# Patient Record
Sex: MALE | Race: WHITE | Employment: OTHER | URBAN - METROPOLITAN AREA
[De-identification: names, ages, dates, MRNs, and addresses within clinical notes are randomized per-mention and may not be internally consistent; named-entity substitution may affect disease eponyms.]

---

## 2020-11-10 ENCOUNTER — HOSPITAL ENCOUNTER (OUTPATIENT)
Dept: BEHAVIORAL HEALTH | Facility: CLINIC | Age: 30
Discharge: HOME | End: 2020-11-10
Attending: FAMILY MEDICINE | Admitting: FAMILY MEDICINE

## 2020-11-10 VITALS — BODY MASS INDEX: 26.24 KG/M2 | WEIGHT: 198 LBS | HEIGHT: 73 IN

## 2020-11-10 DIAGNOSIS — F16.20: ICD-10-CM

## 2020-11-10 DIAGNOSIS — F15.20 AMPHETAMINE USE DISORDER, SEVERE, DEPENDENCE (H): ICD-10-CM

## 2020-11-10 DIAGNOSIS — F17.200 TOBACCO USE DISORDER, MILD, ABUSE: ICD-10-CM

## 2020-11-10 DIAGNOSIS — F11.10 MILD OPIOID USE DISORDER (H): ICD-10-CM

## 2020-11-10 DIAGNOSIS — F12.20 CANNABIS USE DISORDER, MODERATE, DEPENDENCE (H): ICD-10-CM

## 2020-11-10 PROCEDURE — H0001 ALCOHOL AND/OR DRUG ASSESS: HCPCS | Mod: TEL

## 2020-11-10 ASSESSMENT — ANXIETY QUESTIONNAIRES
IF YOU CHECKED OFF ANY PROBLEMS ON THIS QUESTIONNAIRE, HOW DIFFICULT HAVE THESE PROBLEMS MADE IT FOR YOU TO DO YOUR WORK, TAKE CARE OF THINGS AT HOME, OR GET ALONG WITH OTHER PEOPLE: NOT DIFFICULT AT ALL
7. FEELING AFRAID AS IF SOMETHING AWFUL MIGHT HAPPEN: NOT AT ALL
3. WORRYING TOO MUCH ABOUT DIFFERENT THINGS: NOT AT ALL
2. NOT BEING ABLE TO STOP OR CONTROL WORRYING: NOT AT ALL
4. TROUBLE RELAXING: SEVERAL DAYS
GAD7 TOTAL SCORE: 3
5. BEING SO RESTLESS THAT IT IS HARD TO SIT STILL: NOT AT ALL
1. FEELING NERVOUS, ANXIOUS, OR ON EDGE: SEVERAL DAYS
6. BECOMING EASILY ANNOYED OR IRRITABLE: SEVERAL DAYS

## 2020-11-10 ASSESSMENT — PATIENT HEALTH QUESTIONNAIRE - PHQ9: SUM OF ALL RESPONSES TO PHQ QUESTIONS 1-9: 4

## 2020-11-10 ASSESSMENT — MIFFLIN-ST. JEOR: SCORE: 1922

## 2020-11-10 ASSESSMENT — PAIN SCALES - GENERAL: PAINLEVEL: NO PAIN (0)

## 2020-11-10 NOTE — PROGRESS NOTES
"The patient has been notified of the following:     \"We have found that certain health care needs can be provided without the need for a face to face visit.  This service lets us provide the care you need with a phone conversation.      I will have full access to your [facility] medical record during this entire phone call.   I will be taking notes for your medical record.     Since this is like an office visit, we will bill your insurance company for this service.  If your insurance denies the charge we will appeal and/or write off the cost of the service.  The Governor's executive order may result in expanded health insurance coverage for this service, which would be paid by your insurance.         There are potential benefits and risks of telephone visits (e.g. limits to patient confidentiality) that differ from in-person visits.?  Confidentiality still applies for telephone services, and nobody will record the visit.  It is important to be in a quiet, private space that is free of distractions (including cell phone or other devices) during the visit.??     If during the course of the call I believe a telephone visit is not appropriate, you will not be charged for this service\"    Consent has been obtained for this service by care team member: Yes     The pt also gives verbal consent for SAMANTA to and from:      Himself, Email:[email or web address]    His Emergency and Collateral Contact:  Mark Perales (Stepfather) Tel: [phone]    His , Jak Valladares at [facility] Tel:[phone] Email: [email or web address] FAX: [phone]    Phone visit start time:  8:00 a.m.  Phone visit end time:  9:30 a.m.    Rule 25 Assessment  Background Information   1. Date of Assessment Request  2. Date of Assessment  11/10/2020 3. Date Service Authorized     4.   DEDE Wu   5.  Phone Number   [phone] 6. Referent  Self 7. Assessment Site  [facility]" "[facility]     8. Client Name   Lino Colby 9. Date of Birth  1990 Age  30 year old 10. Gender  male  11. PMI/ Insurance No.     12. Client's Primary Language:  English 13. Do you require special accommodations, such as an  or assistance with written material? No   14. Current Address: [address] [place] [address]   15. Client Phone Numbers: [phone] (home)      16. Tell me what has happened to bring you here today.    \"I had court today and I need to get this done due to a probation violation.  I need some kind of treatment.\"    17. Have you had other rule 25 assessments?     Yes. When, Where, and What circumstances: \"I had one last year sometime with [facility].  I have had several.\" .    DIMENSION I - Acute Intoxication /Withdrawal Potential   1. Chemical use most recent 12 months outside a facility and other significant use history (client self-report)              X = Primary Drug Used   Age of First Use Most Recent Pattern of Use and Duration   Need enough information to show pattern (both frequency and amounts) and to show tolerance for each chemical that has a diagnosis   Date of last use and time, if needed   Withdrawal Potential? Requiring special care Method of use  (oral, smoked, snort, IV, etc)      Alcohol     17 \"Age 23, maybe once every weekend using a couple shots each night.\" \"I don't know, it's been more than a month.\" none oral   X   Marijuana/  Hashish   17 Age [age], a gram a week.  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  That continued to age [age] when I stopped selling it.  I used once a week, 5 hits.\"  Past month:  \"I hit my oil cartridge daily, one to two hits.\" \"a couple days ago I smoked a joint with a kulwant.\" low Smoke  edibles      Cocaine/Crack     26 Crack- HU:\"Age [age], when I started Heroin I used more Crack, once every 3 weeks, a 20 or two 20's(comes in point " "2 bags.)\"  Cocaine-\"not a big user of it.\"   \"I don't know, maybe a year ago.\" none Smoke  Snort  IV- 10 or less times.\"   X   Meth/  Amphetamines   26 \"It started slow , a couple points to a quarter gram.  At age 29 I started shooting, using a quarter gram to a gram or maybe more.  It has been daily use since age 29.\"  Currently, \"2 points or under every other day.\"  RX Aderral for 10 years and lost the RX because of pot use. \"yesterday, 2 points.\" low IV  Smoke      Heroin     26 \"I would take point one and split it into 3 rds using a third of a point at a time, maybe twice a week.  It was recreational for me.\" \"a couple months ago.\" none IV  Smoke  snort      Other Opiates/  Synthetics   No use          Inhalants     21 Whippets- \"7-800 Whippets in my life.\" 4-5 months ago. none acevedo      Benzodiazepines     No use          Hallucinogens     20 Acid- \"I have used a lot, like thousands.  I use about 5 hits at a time.\"  Mushrooms- \"Whenever I can get them, sometimes 4-5 grams.\" 3 months ago none oral      Barbiturates/  Sedatives/  Hypnotics No use          Over-the-Counter Drugs   No use          Other     No use       X   Nicotine     18 Cigarettes-\"It was a pack I day 3-4 years ago and now it is 3-4 cigarettes a day.\" 11/10/2020   low Smoke  Chew-once a year.     2. Do you use greater amounts of alcohol/other drugs to feel intoxicated or achieve the desired effect?  No.  Or use the same amount and get less of an effect?  No.  Example: The patient denied having any history of tolerance with alcohol and/or drugs.    3A. Have you ever been to detox?     No    3B. When was the first time?     The patient denied ever having a detoxification admission.    3C. How many times since then?     The patient denied ever having a detoxification admission.    3D. Date of most recent detox:     The patient denied ever having a detoxification admission.    4.  Withdrawal symptoms: Have you had any of the following withdrawal " symptoms?  Past 12 months Recent (past 30 days)   Agitation  Fatigue / Extremely Tired  Irritability Fatigue / Extremely Tired     's Visual Observations and Symptoms: telephone visit, could not assess.    Based on the above information, is withdrawal likely to require attention as part of treatment participation?  No    Dimension I Ratings   Acute intoxication/Withdrawal potential - The placing authority must use the criteria in Dimension I to determine a client s acute intoxication and withdrawal potential.    RISK DESCRIPTIONS - Severity ratin Client displays full functioning with good ability to tolerate and cope with withdrawal discomfort. No signs or symptoms of intoxication or withdrawal or resolving signs or symptoms.    REASONS SEVERITY WAS ASSIGNED (What about the amount of the person s use and date of most recent use and history of withdrawal problems suggests the potential of withdrawal symptoms requiring professional assistance? )     Patient reports that his last use of Methamphetamine was yesterday and marijuana a couple days ago.  Other substances have been at least over a month ago.  The pt reports a few symptoms of withdrawal.  The pt may experience withdrawal should he use heroin again.       DIMENSION II - Biomedical Complications and Conditions   1a. Do you have any current health/medical conditions?(Include any infectious diseases, allergies, or chronic or acute pain, history of chronic conditions)       No    1b. On a scale of mild, moderate to severe please specify the severity of the patient's diabetes and/or neuropathy.    The patient denied having a history of being diagnosed with diabetes or neuropathy.    2. Do you have a health care provider? When was your most recent appointment? What concerns were identified?     The patient does not have a PCP or a primary clinic and goes to [facility] when needing any medical attention.    3. If indicated by answers to items 1 or 2: How do you  deal with these concerns? Is that working for you? If you are not receiving care for this problem, why not?      The patient denied having any current clinical health issues.    4A. List current medication(s) including over-the-counter or herbal supplements--including pain management:     The patient denied taking any prescription or over the counter medications at this time.     4B. Do you follow current medical recommendations/take medications as prescribed?     The patient denied taking any prescription or over the counter medications at this time.    4C. When did you last take your medication?     The patient denied taking any prescription or over the counter medications at this time.    4D. Do you need a referral to have a follow up with a primary care physician?    No.    5. Has a health care provider/healer ever recommended that you reduce or quit alcohol/drug use?     Yes    6. Are you pregnant?     NA, because the patient is male.    7. Have you had any injuries, assaults/violence towards you, accidents, health related issues, overdose(s) or hospitalizations related to your use of alcohol or other drugs:     No    8. Do you have any specific physical needs/accommodations? No    Dimension II Ratings   Biomedical Conditions and Complications - The placing authority must use the criteria in Dimension II to determine a client s biomedical conditions and complications.   RISK DESCRIPTIONS - Severity ratin Client displays full functioning with good ability to cope with physical discomfort.    REASONS SEVERITY WAS ASSIGNED (What physical/medical problems does this person have that would inhibit his or her ability to participate in treatment? What issues does he or she have that require assistance to address?)    Pt denies having any medical issues or taking any medications. Patient denied having any chronic biomedical conditions that would interfere with treatment.  He reported having no pain at this time, but  "does have some back pain at times.  Patient has no PCP or primary care clinic at this time.  He states that he goes to [facility] when needing any medical attention.  He reports having a doctor four years ago, but he became upset when he took him off of Aderral.  Pt reports that he is a daily cigarette smoker of 4-5 cigarettes a day and is not inclined to quit smoking at this time.        DIMENSION III - Emotional, Behavioral, Cognitive Conditions and Complications   1. (Optional) Tell me what it was like growing up in your family. (substance use, mental health, discipline, abuse, support)     \"I was raised by my Step father(from age 5) and my Mother.  I have 4 half-siblings, 2 bothers and 2 sisters.  I don't talk with them. I felt invisible.  I saw my father every other weekend.  There was not really any abuse, just the standard spankings.\"    2. When was the last time that you had significant problems...  A. with feeling very trapped, lonely, sad, blue, depressed or hopeless  about the future? 2 - 12 months ago    B. with sleep trouble, such as bad dreams, sleeping restlessly, or falling  asleep during the day? 2 - 12 months ago    C. with feeling very anxious, nervous, tense, scared, panicked, or like  something bad was going to happen? 2 - 12 months ago    D. with becoming very distressed and upset when something reminded  you of the past? 2 - 12 months ago    E. with thinking about ending your life or committing suicide? Never    3. When was the last time that you did the following things two or more times?  A. Lied or conned to get things you wanted or to avoid having to do  something? 2 - 12 months ago    B. Had a hard time paying attention at school, work, or home? Past Month-\"ADHD.\"    C. Had a hard time listening to instructions at school, work, or home? Past Month-\"ADHD.\"  D. Were a bully or threatened other people? Never    E. Started physical fights with other people? Never    Note: These questions are from " "the Global Appraisal of Individual Needs--Short Screener. Any item marked  past month  or  2 to 12 months ago  will be scored with a severity rating of at least 2.     For each item that has occurred in the past month or past year ask follow up questions to determine how often the person has felt this way or has the behavior occurred? How recently? How has it affected their daily living? And, whether they were using or in withdrawal at the time?    See above    4A. If the person has answered item 2E with  in the past year  or  the past month , ask about frequency and history of suicide in the family or someone close and whether they were under the influence.     The patient denied any family member or someone close to the patient had ever completed suicide.    Any history of suicide in your family? Or someone close to you?     The patient denied any family member or someone close to the patient had ever completed suicide.    4B. If the person answered item 2E  in the past month  ask about  intent, plan, means and access and any other follow-up information  to determine imminent risk. Document any actions taken to intervene  on any identified imminent risk.      The patient denied having any suicide ideation within the past month.    5A. Have you ever been diagnosed with a mental health problem?     Yes, explain: \"Major Depressive Disorder and ADHD.\"      5B. Are you receiving care for any mental health issues? If yes, what is the focus of that care or treatment?  Are you satisfied with the service? Most recent appointment?  How has it been helpful?     The patient reported having prior treatment for mental health issues, but denied receiving any current treatment for mental health issues.    6. Have you been prescribed medications for emotional/psychological problems?     Yes, \"In the past, for ADHD and Depression, I was prescribed  Prozac, Effexor and Citalopram and Aderralin the past.\"    7. Does your MH provider " "know about your use?     The patient does not currently have any mental health providers.    8A. Have you ever been verbally, emotionally, physically or sexually abused?      No     Follow up questions to learn current risk, continuing emotional impact.      The patient denied having any history of being verbally, emotionally, physically or sexually abused.    8B. Have you received counseling for abuse?      The patient denied having any history of being verbally, emotionally, physically or sexually abused.    9. Have you ever experienced or been part of a group that experienced community violence, historical trauma, rape or assault?     No    10A. [place]:    No    11. Do you have problems with any of the following things in your daily life?    Concentration and In relationships with others .  The pt reports he has been arrested about 20 times mostly for drug possession or shoplifting.  \"There was one assault charge when I was 18.  That was with my biological father.\"  He states that he has 6-7 felonies.    Note: If the person has any of the above problems, follow up with items 12, 13, and 14. If none of the issues in item 11 are a problem for the person, skip to item 15.    The patient would benefit from developing sober coping skills.    12. Have you been diagnosed with traumatic brain injury or Alzheimer s?  No    13. If the answer to #12 is no, ask the following questions:    Have you ever hit your head or been hit on the head? No    Were you ever seen in the Emergency Room, hospital or by a doctor because of an injury to your head? No    Have you had any significant illness that affected your brain (brain tumor, meningitis, West Nile Virus, stroke or seizure, heart attack, near drowning or near suffocation)? No    14. If the answer to #12 is yes, ask if any of the problems identified in #11 occurred since the head injury or loss of oxygen. The patient had never had a head injury or a loss of oxygen.    15A. " "Highest grade of school completed:     Some college, but no degree    15B. Do you have a learning disability? Yes, \"ADHD and have been told I am on the Autism spectrum.\"    15C. Did you ever have tutoring in Math or English? Yes    15D. Have you ever been diagnosed with Fetal Alcohol Effects or Fetal Alcohol Syndrome? No    16. If yes to item 15 B, C, or D: How has this affected your use or been affected by your use?     Yes, \"I think it does affect my wanting to use Methamphetamine.\"    Dimension III Ratings   Emotional/Behavioral/Cognitive - The placing authority must use the criteria in Dimension III to determine a client s emotional, behavioral, and cognitive conditions and complications.   RISK DESCRIPTIONS - Severity ratin Client has impulse control and coping skills. Client presents a mild to moderate risk of harm to self or others or displays symptoms of emotional, behavioral or cognitive problems. Client has a mental health diagnosis and is stable. Client functions adequately in significant life areas.    REASONS SEVERITY WAS ASSIGNED - What current issues might with thinking, feelings or behavior pose barriers to participation in a treatment program? What coping skills or other assets does the person have to offset those issues? Are these problems that can be initially accommodated by a treatment provider? If not, what specialized skills or attributes must a provider have?    The patient reports having a past mental health diagnosis of ADHD and Depression . Pt is not taking any medication for his mental health at this time. The pt reports feeling as though being taken off Aderral about 4 years ago was the turning point.  He used methamphetamine instead , lost his job and \"life went downhill.\"  Pt reports no history of  Abuse, but spankings as punishment.  He reports that he was hospitalized during high school for behavioral problems, \"drawing graphic pictures at school.\" At the time of the assessment, " "pt's PHQ-9 score was 4 indicating (minimal depression) and his FARNAZ-7 score was 3 indicating (minimal anxiety).  Pt denies SIB, SA, suicidal thoughts at this time or at any time in his life. During pt's assessment, his [place]-Suicide Severity Rating Scale score was 0. - Very Low Risk:  Evaluation Counselors:  Document in Epic / SBAR to counselor \"Very Low Risk\". Treatment Counselors:  Reassess upon admission as applicable, assess weekly in progress notes under Dimension 3 and summarize in Discharge / Treatment summary under Dimension 3. indicating a level of risk.  Pt lacks emotional and stress management skills.  Patient reported betting more money than he had planned\" and could benefit from a gambling assessment.  He would also benefit from an updated mental health evaluation and beginning individual mental health therapy.       DIMENSION IV - Readiness for Change   1. You ve told me what brought you here today. (first section) What do you think the problem really is?     \"I am going to court today.  The real problem is selling drugs.\"    2. Tell me how things are going. Ask enough questions to determine whether the person has use related problems or assets that can be built upon in the following areas:     Family/friends/relationships; \"I don't talk with my siblings.  My Mom and Stepfather are a huge support for me. I did have a best friend, but he passed away in Spring of this year.  I have some close friends who also use.\"  Legal; The pt reports he has been arrested about 20 times mostly for drug possession or shoplifting.  \"There was one assault charge when I was 18.  That was with my biological father.\"  He states that he has 6-7 felonies.  Financial; \"I am maintaining.  I don't have a lot of debt.\"  Emotional; \"I am doing alright.\"  Educational; \"I am satisfied, but I can a;ways learn more.\"   Recreational/ leisure; \"I like to isaías and fix things.  I like power tools.\"  Vocational/employment; The pt does " "work under the table fixing things, doing odd jobs.\"  Living arrangements (DSM):  \"I live alone in a hotel in [place].\"    3. What activities have you engaged in when using alcohol/other drugs that could be hazardous to you or others (i.e. driving a car/motorcycle/boat, operating machinery, unsafe sex, sharing needles for drugs or tattoos, etc     The patient reported having a history of using IV drugs.    4. How much time do you spend getting, using or getting over using alcohol or drugs? (DSM)     \"I spend little time getting drugs, using a lot.  I am using every other day.\"    5. Reasons for drinking/drug use (Use the space below to record answers. It may not be necessary to ask each item.)  Like the feeling No   Trying to forget problems No   To cope with stress Yes   To relieve physical pain No   To cope with anxiety Yes   To cope with depression No   To relax or unwind Yes   Makes it easier to talk with people No   Partner encourages use No   Most friends drink or use Yes   To cope with family problems No   Afraid of withdrawal symptoms/to feel better No   Other (specify)  No     A. What concerns other people about your alcohol or drug use/Has anyone told you that you use too much? What did they say? (DSM)     \"My Mom is concerned about my getting into trouble.\"    B. What did you think about that/ do you think you have a problem with alcohol or drug use?     It's not the drugs.  I just have a hard time functioning in society.  I can't find my place.  That is why I use drugs.\"    6. What changes are you willing to make? What substance are you willing to stop using? How are you going to do that? Have you tried that before? What interfered with your success with that goal?      \" I have to quit because of probation.  I don't want to quit.  I cannot do inpatient.  I don't want to lose my possessions, my car and my tools.\"    7. What would be helpful to you in making this change?     \"a daily outpatient thing " "like 3-4 hours a day.\"    Dimension IV Ratings   Readiness for Change - The placing authority must use the criteria in Dimension IV to determine a client s readiness for change.   RISK DESCRIPTIONS - Severity rating: 3 Client displays inconsistent compliance, minimal awareness of either the client's addiction or mental disorder, and is minimally cooperative.    REASONS SEVERITY WAS ASSIGNED - (What information did the person provide that supports your assessment of his or her readiness to change? How aware is the person of problems caused by continued use? How willing is she or he to make changes? What does the person feel would be helpful? What has the person been able to do without help?)      The pt reports that boredom is his problem.  He does not appear to believe that drugs are damaging to him except for the legal trouble they cause. Patient lacks insight into the effects substance use has had on his physical, social and mental health and he lacks consistent behaviors for continued sobriety.  Patient states that he does not want treatment, but has to to avoid assisted time.Pt has continued to use despite significant legal problems and homelessness. Pt is willing to attend outpatient if he has to.  He does not want inpatient although he clearly meets criteria.  Pt appears to be in the Pre-Contemplation Stage within the Stages of Change Model.        DIMENSION V - Relapse, Continued Use, and Continued Problem Potential   1A. In what ways have you tried to control, cut-down or quit your use? If you have had periods of sobriety, how did you accomplish that? What was helpful? What happened to prevent you from continuing your sobriety? (DSM)     \" 15 months off all drugs 4 years ago, age [age].\"    1B. What were the circumstances of your most recent relapse with mood altering chemicals?    \"Boredom.\"    2. Have you experienced cravings? If yes, ask follow up questions to determine if the person recognizes triggers and " "if the person has had any success in dealing with them.     The patient reported having cravings to use mood altering chemicals on an almost daily basis because of the activities that come along with it, theft, sex and gambling.  Cravings are a 6 out of 10.    3. Have you been treated for alcohol/other drug abuse/dependence?   Yes.    3B. Number of times(lifetime) (over what period) 'From age 21- 29.\".    3C. Number of times completed treatment (lifetime) 2-3 inpatient.    3D. During the past three years have you participated in outpatient and/or residential?  Yes.    3E. When and where? \"[facility] [address] [facility].\".     3F. What was helpful? \"None of the material was helpful, but I enjoyed talking with people.\" What was not? \"bad food and over eating.\".    4. Support group participation: Have you/do you attend support group meetings to reduce/stop your alcohol/drug use? How recently? What was your experience? Are you willing to restart? If the person has not participated, is he or she willing?     Pt has attended, but not in the last year.    5. What would assist you in staying sober/straight?     \"a daily outpatient thing like 3-4 hours a day.\"    Dimension V Ratings   Relapse/Continued Use/Continued problem potential - The placing authority must use the criteria in Dimension V to determine a client s relapse, continued use, and continued problem potential.   RISK DESCRIPTIONS - Severity ratin No awareness of the negative impact of mental health problems or substance abuse. No coping skills to arrest mental health or addiction illnesses, or prevent relapse.    REASONS SEVERITY WAS ASSIGNED - (What information did the person provide that indicates his or her understanding of relapse issues? What about the person s experience indicates how prone he or she is to relapse? What coping skills does the person have that decrease relapse potential?)      The pt is currently using Meth and marijuana.    Patient " "has attended 5 CD treatments, completing the inpatient portion of treatment 2-3 times.  Pt has minimally attended 12 step groups and the last meeting he attended was over a year ago.  Pt reported having 15 of being sober 4 years ago.  Pt identified his triggers as boredom.  Pt lacks minimal impulse control along with lacking sober coping skills.  Pt reports that he is very much involved with the use of drugs, theft, sex and gambling.  Patient has untreated co-occurring mental health and substance use issues, which places him at higher risk for continued use.       DIMENSION VI - Recovery Environment   1. Are you employed/attending school? Tell me about that.      The pt does work \"under the table fixing things, doing odd jobs.\"  He does not attend school.    2A. Describe a typical day; evening for you. Work, school, social, leisure, volunteer, spiritual practices. Include time spent obtaining, using, recovering from drugs or alcohol. (DSM)     \"I wake up, plan out my day, check my phone to see who need whatever items, get said items, see if anyone needs any work done.  In the evenings around 8 or 9 I stay indoors, listen to music, use drugs, hang out, have sex, watch TV or movies.\"    Please describe what leisure activities have been associated with your substance abuse:     \"retail theft.\"    2B. How often do you spend more time than you planned using or use more than you planned? (DSM)       \"I spend little time getting drugs, using a lot.  I am using every other day.\"    3. How important is using to your social connections? Do many of your family or friends use?     \"My Mom and Stepfather don't use.  I don't know about my siblings.  Most of my friends do.\"    4A. Are you currently in a significant relationship?     Yes.  4B. How long? \"on and off for four years.\"  Please describe your significant other's use of mood altering chemicals? \"She uses Meth.\"    4C. Sexual Orientation:     Heterosexual    5A. Who do you " "live with?      The patient lives alone in a hotel at this time.    5B. Tell me about their alcohol/drug use and mental health issues.     \"She uses Meth.\"    5C. Are you concerned for your safety there? No    5D. Are you concerned about the safety of anyone else who lives with you? No    6A. Do you have children who live with you?     The patient denied having any children.    6B. Do you have children who do not live with you?     The patient denied having any children.    7A. Who supports you in making changes in your alcohol or drug use? What are they willing to do to support you? Who is upset or angry about you making changes in your alcohol or drug use? How big a problem is this for you?        \"My parents.\"    7B. This table is provided to record information about the person s relationships and available support It is not necessary to ask each item; only to get a comprehensive picture of their support system.  How often can you count on the following people when you need someone?   Partner / Spouse Always supportive   Parent(s)/Aunt(s)/Uncle(s)/Grandparents Usually supportive   Sibling(s)/Cousin(s) The patient doesn't have any current contact with siblings.   Child(haleigh) The patient doesn't have any children.   Other relative(s) The patient doesn't have any current contact with other relatives.   Friend(s)/neighbor(s) Always supportive   Child(haleigh) s father(s)/mother(s) The patient doesn't have any children.   Support group member(s) The patient denied having any current involvement with 12-step or other support group meetings.   Community of sangita members The patient denied having any current involvement with community sangita members.   /counselor/therapist/healer The patient denied having any current involvement with a , counselor, therapist or healer.   Other (specify) No     8A. What is your current living situation?     The pt is currently homeless and living in a hotel in " "[place], [place].    8B. What is your long term plan for where you will be living?     \"I would like to rent another house.\"    8C. Tell me about your living environment/neighborhood? Ask enough follow up questions to determine safety, criminal activity, availability of alcohol and drugs, supportive or antagonistic to the person making changes.      The pt has no concerns.    9. Criminal justice history: Gather current/recent history and any significant history related to substance use--Arrests? Convictions? Circumstances? Alcohol or drug involvement? Sentences? Still on probation or parole? Expectations of the court? Current court order? Any sex offenses - lifetime? What level? (DSM)    The pt reports he has been arrested about 20 times mostly for drug possession or shoplifting.  \"There was one assault charge when I was 18.  That was with my biological father.\"  He states that he has 6-7 felonies.    10. What obstacles exist to participating in treatment? (Time off work, childcare, funding, transportation, pending halfway time, living situation)     The pt stated that he could not attend inpatient as he has his possessions and tools in his car and no place to park it for a month or more.      Dimension VI Ratings   Recovery environment - The placing authority must use the criteria in Dimension VI to determine a client s recovery environment.   RISK DESCRIPTIONS - Severity rating: 3 Client is not engaged in structured, meaningful activity and the client's peers, family, significant other, and living environment are unsupportive, or there is significant criminal justice system involvement.    REASONS SEVERITY WAS ASSIGNED - (What support does the person have for making changes? What structure/stability does the person have in his or her daily life that will increase the likelihood that changes can be sustained? What problems exist in the person s environment that will jeopardize getting/staying clean and sober?)     Per " "his PO the pt is \"basically homeless\".  The pt has no sober support network.  Family/friends/relationships; \"I don't talk with my siblings.  My Mom and Stepfather are a huge support for me. I did have a best friend, but he passed away in Spring of this year.  I have some close friends who also use.\" Legal; The pt reports he has been arrested about 20 times mostly for drug possession or shoplifting.  \"There was one assault charge when I was 18.  That was with my biological father.\"  He states that he has 6-7 felonies.Financial; \"I am maintaining.  I don't have a lot of debt.\"  Emotional; \"I am doing alright.\"Educational; \"I am satisfied, but I can a;ways learn more.\" Recreational/ leisure; \"I like to isaías and fix things.  I like power tools.\"  Vocational/employment; The pt does work \"under the table fixing things, doing odd jobs.\"Living arrangements (DSM):  \"I live alone in a hotel in [place].\"       Client Choice/Exceptions   Would you like services specific to language, age, gender, culture, Rastafarian preference, race, ethnicity, sexual orientation or disability?  No    What particular treatment choices and options would you like to have? None.  Pt reports if he has to go to tx he prefers outpatient.    Do you have a preference for a particular treatment program? None    Criteria for Diagnosis     Criteria for Diagnosis  DSM-5 Criteria for Substance Use Disorder  Instructions: Determine whether the client currently meets the criteria for Substance Use Disorder using the diagnostic criteria in the DSM-V pp.481-589. Current means during the most recent 12 months outside a facility that controls access to substances    Category of Substance Severity (ICD-10 Code / DSM 5 Code)     Alcohol Use Disorder The patient does not meet the criteria for an Alcohol use disorder.   Cannabis Use Disorder Moderate  (F12.20) (304.30)   Hallucinogen Use Disorder Severe   (F16.20) (304.50)   Inhalant Use Disorder The patient does " not meet the criteria for an Inhalant use disorder.   Opioid Use Disorder Mild   (F11.10) (305.50)   Sedative, Hypnotic, or Anxiolytic Use Disorder The patient does not meet the criteria for a Sedative/Hypnotic use disorder.   Stimulant Related Disorder Severe   (F15.20) (304.40) Amphetamine type substance   Tobacco Use Disorder Mild    (Z72.0) (305.1)   Other (or unknown) Substance Use Disorder The patient does not meet the criteria for a Other (or unknown) Substance use disorder.       Collateral Contact Summary   Number of contacts made: two    Contact with referring person:  Yes    If court related records were reviewed, summarize here: No court records had been reviewed at the time of this documentation.    Information from collateral contacts supported/largely agreed with information from the client and associated risk ratings.      Rule 25 Assessment Summary and Plan   's Recommendation    1)  Complete a 30-90 day residential based or similar treatment program, such as [facility], [facility] or [facility].   2)  Abstain from all mood-altering chemicals unless prescribed by a licensed provider.   3)  Per your treatment team's recommendation, attend weekly support group meetings, such as 12 Step based (AA/NA), SMART Recovery, and/or [facility] meetings.     4)  Actively work with a male mentor/sponsor on a weekly basis.   5)  Follow all the recommendations of your treatment/medical providers including aftercare plans.  6)  Remain law abiding and follow all recommendations of the Courts/PO.  7) Patient may benefit from obtaining a full mental health evaluation and by attending individual psychotherapy appointments.    8)  Patient may benefit from obtaining a full gambling assessment.     Collateral Contacts     Name:    Jak Valladares   Relationship:        Phone Number:    [phone]  [email or web address]  FAX: [phone] Releases:    Yes  "    Pt's PO reports that he is \"getting police reports every 2 weeks that he is doing something illegal.  This has been happening for years.  He has 6 drug convictions.  He is basically homeless and needs inpatient treatment.\"      Collateral Contacts     Name:    Mark Perales   Relationship:    Blanca   Phone Number:    [phone]   Releases:    Yes     Attempted a call to him on 11/10/2020.  Busy signal.  ollateral Contacts      A problematic pattern of alcohol/drug use leading to clinically significant impairment or distress, as manifested by at least two of the following, occurring within a 12-month period:    1.) Alcohol/drug is often taken in larger amounts or over a longer period than was intended.  3.) A great deal of time is spent in activities necessary to obtain alcohol, use alcohol, or recover from its effects.  4.) Craving, or a strong desire or urge to use alcohol/drug  8.) Recurrent alcohol/drug use in situations in which it is physically hazardous.  9.) Alcohol/drug use is continued despite knowledge of having a persistent or recurrent physical or psychological problem that is likely to have been caused or exacerbated by alcohol.  11.) Withdrawal, as manifested by either of the following: The characteristic withdrawal syndrome for alcohol/drug (refer to Criteria A and B of the criteria set for alcohol/drug withdrawal).        Specify if: In early remission:  After full criteria for alcohol/drug use disorder were previously met, none of the criteria for alcohol/drug use disorder have been met for at least 3 months but for less than 12 months (with the exception that Criterion A4,  Craving or a strong desire or urge to use alcohol/drug  may be met).     In sustained remission:   After full criteria for alcohol use disorder were previously met, non of the criteria for alcohol/drug use disorder have been met at any time during a period of 12 months or longer (with the exception that Criterion A4, "  Craving or strong desire or urge to use alcohol/drug  may be met).   Specify if:   This additional specifier is used if the individual is in an environment where access to alcohol is restricted.    Mild: Presence of 2-3 symptoms  Moderate: Presence of 4-5 symptoms  Severe: Presence of 6 or more symptoms
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
## 2020-11-11 ASSESSMENT — ANXIETY QUESTIONNAIRES: GAD7 TOTAL SCORE: 3

## 2021-02-21 ENCOUNTER — HOSPITAL ENCOUNTER (EMERGENCY)
Facility: CLINIC | Age: 31
Discharge: HOME | End: 2021-02-21
Attending: EMERGENCY MEDICINE | Admitting: EMERGENCY MEDICINE
Payer: COMMERCIAL

## 2021-02-21 VITALS
HEART RATE: 84 BPM | RESPIRATION RATE: 9 BRPM | WEIGHT: 202.82 LBS | OXYGEN SATURATION: 97 % | TEMPERATURE: 98.5 F | HEIGHT: 72 IN | BODY MASS INDEX: 27.47 KG/M2 | SYSTOLIC BLOOD PRESSURE: 114 MMHG | DIASTOLIC BLOOD PRESSURE: 79 MMHG

## 2021-02-21 DIAGNOSIS — T40.601A OPIATE OVERDOSE, ACCIDENTAL OR UNINTENTIONAL, INITIAL ENCOUNTER (H): ICD-10-CM

## 2021-02-21 LAB
ANION GAP SERPL CALCULATED.3IONS-SCNC: 4 MMOL/L (ref 3–14)
BASE EXCESS BLDV CALC-SCNC: 4.5 MMOL/L
BASOPHILS # BLD AUTO: 0 10E9/L (ref 0–0.2)
BASOPHILS NFR BLD AUTO: 0.4 %
BUN SERPL-MCNC: 9 MG/DL (ref 7–30)
CALCIUM SERPL-MCNC: 8.4 MG/DL (ref 8.5–10.1)
CHLORIDE SERPL-SCNC: 107 MMOL/L (ref 94–109)
CO2 SERPL-SCNC: 29 MMOL/L (ref 20–32)
CREAT SERPL-MCNC: 0.89 MG/DL (ref 0.66–1.25)
DIFFERENTIAL METHOD BLD: ABNORMAL
EOSINOPHIL # BLD AUTO: 0.1 10E9/L (ref 0–0.7)
EOSINOPHIL NFR BLD AUTO: 0.9 %
ERYTHROCYTE [DISTWIDTH] IN BLOOD BY AUTOMATED COUNT: 11.9 % (ref 10–15)
GFR SERPL CREATININE-BSD FRML MDRD: >90 ML/MIN/{1.73_M2}
GLUCOSE SERPL-MCNC: 150 MG/DL (ref 70–99)
HCO3 BLDV-SCNC: 32 MMOL/L (ref 21–28)
HCT VFR BLD AUTO: 38.3 % (ref 40–53)
HGB BLD-MCNC: 13 G/DL (ref 13.3–17.7)
IMM GRANULOCYTES # BLD: 0 10E9/L (ref 0–0.4)
IMM GRANULOCYTES NFR BLD: 0.4 %
LYMPHOCYTES # BLD AUTO: 1.7 10E9/L (ref 0.8–5.3)
LYMPHOCYTES NFR BLD AUTO: 30.4 %
MCH RBC QN AUTO: 28.1 PG (ref 26.5–33)
MCHC RBC AUTO-ENTMCNC: 33.9 G/DL (ref 31.5–36.5)
MCV RBC AUTO: 83 FL (ref 78–100)
MONOCYTES # BLD AUTO: 0.5 10E9/L (ref 0–1.3)
MONOCYTES NFR BLD AUTO: 8.1 %
NEUTROPHILS # BLD AUTO: 3.3 10E9/L (ref 1.6–8.3)
NEUTROPHILS NFR BLD AUTO: 59.8 %
NRBC # BLD AUTO: 0 10*3/UL
NRBC BLD AUTO-RTO: 0 /100
O2/TOTAL GAS SETTING VFR VENT: ABNORMAL %
PCO2 BLDV: 60 MM HG (ref 40–50)
PH BLDV: 7.34 PH (ref 7.32–7.43)
PLATELET # BLD AUTO: 270 10E9/L (ref 150–450)
PO2 BLDV: 29 MM HG (ref 25–47)
POTASSIUM SERPL-SCNC: 3.3 MMOL/L (ref 3.4–5.3)
RBC # BLD AUTO: 4.62 10E12/L (ref 4.4–5.9)
SODIUM SERPL-SCNC: 140 MMOL/L (ref 133–144)
WBC # BLD AUTO: 5.6 10E9/L (ref 4–11)

## 2021-02-21 PROCEDURE — 80048 BASIC METABOLIC PNL TOTAL CA: CPT | Performed by: EMERGENCY MEDICINE

## 2021-02-21 PROCEDURE — 82803 BLOOD GASES ANY COMBINATION: CPT | Performed by: EMERGENCY MEDICINE

## 2021-02-21 PROCEDURE — 96374 THER/PROPH/DIAG INJ IV PUSH: CPT | Performed by: EMERGENCY MEDICINE

## 2021-02-21 PROCEDURE — 96375 TX/PRO/DX INJ NEW DRUG ADDON: CPT | Performed by: EMERGENCY MEDICINE

## 2021-02-21 PROCEDURE — 99284 EMERGENCY DEPT VISIT MOD MDM: CPT | Performed by: EMERGENCY MEDICINE

## 2021-02-21 PROCEDURE — 250N000011 HC RX IP 250 OP 636: Performed by: EMERGENCY MEDICINE

## 2021-02-21 PROCEDURE — 99284 EMERGENCY DEPT VISIT MOD MDM: CPT | Mod: 25 | Performed by: EMERGENCY MEDICINE

## 2021-02-21 PROCEDURE — 85025 COMPLETE CBC W/AUTO DIFF WBC: CPT | Performed by: EMERGENCY MEDICINE

## 2021-02-21 RX ORDER — NALOXONE HYDROCHLORIDE 0.4 MG/ML
0.4 INJECTION, SOLUTION INTRAMUSCULAR; INTRAVENOUS; SUBCUTANEOUS
Status: DISCONTINUED | OUTPATIENT
Start: 2021-02-21 | End: 2021-02-21 | Stop reason: HOSPADM

## 2021-02-21 RX ORDER — NALOXONE HYDROCHLORIDE 0.4 MG/ML
0.2 INJECTION, SOLUTION INTRAMUSCULAR; INTRAVENOUS; SUBCUTANEOUS
Status: DISCONTINUED | OUTPATIENT
Start: 2021-02-21 | End: 2021-02-21 | Stop reason: HOSPADM

## 2021-02-21 RX ORDER — ONDANSETRON 2 MG/ML
4 INJECTION INTRAMUSCULAR; INTRAVENOUS ONCE
Status: COMPLETED | OUTPATIENT
Start: 2021-02-21 | End: 2021-02-21

## 2021-02-21 RX ADMIN — NALXONE HYDROCHLORIDE 0.4 MG: 0.4 INJECTION INTRAMUSCULAR; INTRAVENOUS; SUBCUTANEOUS at 07:45

## 2021-02-21 RX ADMIN — ONDANSETRON 4 MG: 2 INJECTION INTRAMUSCULAR; INTRAVENOUS at 07:22

## 2021-02-21 RX ADMIN — NALXONE HYDROCHLORIDE 0.4 MG: 0.4 INJECTION INTRAMUSCULAR; INTRAVENOUS; SUBCUTANEOUS at 07:34

## 2021-02-21 RX ADMIN — NALXONE HYDROCHLORIDE 0.4 MG: 0.4 INJECTION INTRAMUSCULAR; INTRAVENOUS; SUBCUTANEOUS at 07:22

## 2021-02-21 ASSESSMENT — ENCOUNTER SYMPTOMS
BACK PAIN: 0
DIARRHEA: 0
CHEST TIGHTNESS: 0
COUGH: 0
ABDOMINAL PAIN: 0
HEADACHES: 1
WOUND: 0
VOMITING: 0
FEVER: 0
FATIGUE: 0
SHORTNESS OF BREATH: 0
DIAPHORESIS: 0
NAUSEA: 0
APPETITE CHANGE: 0
DYSURIA: 0
CHILLS: 0

## 2021-02-21 ASSESSMENT — MIFFLIN-ST. JEOR: SCORE: 1928

## 2021-02-21 NOTE — ED PROVIDER NOTES
History     Chief Complaint   Patient presents with     Drug Overdose     Fentanyl 1 hr ago     HPI  Lino Colby is a 30 year old male with a history of multidrug abuse in the past presenting for evaluation of an opiate overdose.  Per report from EMS, patient's was reportedly using fentanyl and a friend that was with him felt he was overdosing so gave him intranasal naloxone and contacted 911.  Patient states he usually uses 10-15mg but injected about 20mg tonight. Denies self-harm intent. Police arrived and gave at least 1 additional dose of intranasal Narcan which improved his breathing but patient remained unresponsive upon arrival of EMS.  EMS obtain IV access and gave an additional 0.5 mg of IV naloxone with subsequent return to alertness.  Brought in by EMS for further evaluation of this acute life-threatening overdose.  Last dose of Narcan by EMS was around 4:55 AM.    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    Past Surgical History:    No past surgical history on file.    Family History:    Family History   Problem Relation Age of Onset     Substance Abuse Paternal Grandmother      Substance Abuse Paternal Grandfather      Substance Abuse Other        Social History:  Marital Status:  Single [1]  Social History     Tobacco Use     Smoking status: Current Every Day Smoker     Packs/day: 0.25     Smokeless tobacco: Former User   Substance Use Topics      Alcohol use: No     Drug use: No        Medications:    naloxone (NARCAN) 4 MG/0.1ML nasal spray          Review of Systems   Constitutional: Negative for appetite change, chills, diaphoresis, fatigue and fever.   HENT: Negative for congestion.    Respiratory: Negative for cough, chest tightness and shortness of breath.    Cardiovascular: Negative for chest pain.   Gastrointestinal: Negative for abdominal pain, diarrhea, nausea and vomiting.   Genitourinary: Negative for dysuria.   Musculoskeletal: Negative for back pain.   Skin: Negative for rash and wound.   Neurological: Positive for headaches.   All other systems reviewed and are negative.      Physical Exam   BP: 132/86  Pulse: 99  Temp: 98.3  F (36.8  C)  Resp: 18  Height: 182.9 cm (6')  Weight: 92 kg (202 lb 13.2 oz)  SpO2: 99 %      Physical Exam  Vitals signs and nursing note reviewed.   Constitutional:       Appearance: He is not toxic-appearing or diaphoretic.      Comments: Disheveled   HENT:      Head: Normocephalic.        Nose: Nose normal.      Mouth/Throat:      Mouth: Mucous membranes are moist.   Eyes:      Conjunctiva/sclera: Conjunctivae normal.      Pupils: Pupils are equal, round, and reactive to light.   Neck:      Musculoskeletal: Normal range of motion.   Cardiovascular:      Rate and Rhythm: Normal rate and regular rhythm.      Pulses: Normal pulses.   Pulmonary:      Effort: Pulmonary effort is normal.      Breath sounds: Normal breath sounds. No wheezing or rhonchi.   Abdominal:      Palpations: Abdomen is soft.      Tenderness: There is no abdominal tenderness.   Musculoskeletal: Normal range of motion.   Skin:     General: Skin is warm and dry.      Capillary Refill: Capillary refill takes less than 2 seconds.   Neurological:      Mental Status: He is alert and oriented to person, place, and time.      Sensory: No sensory deficit.      Motor: No weakness.   Psychiatric:         Attention and Perception: Attention normal.         Mood  and Affect: Mood normal.         Speech: Speech normal.         Behavior: Behavior is cooperative.         ED Course        Procedures                        Results for orders placed or performed during the hospital encounter of 02/21/21 (from the past 24 hour(s))   CBC with platelets differential   Result Value Ref Range    WBC 5.6 4.0 - 11.0 10e9/L    RBC Count 4.62 4.4 - 5.9 10e12/L    Hemoglobin 13.0 (L) 13.3 - 17.7 g/dL    Hematocrit 38.3 (L) 40.0 - 53.0 %    MCV 83 78 - 100 fl    MCH 28.1 26.5 - 33.0 pg    MCHC 33.9 31.5 - 36.5 g/dL    RDW 11.9 10.0 - 15.0 %    Platelet Count 270 150 - 450 10e9/L    Diff Method Automated Method     % Neutrophils 59.8 %    % Lymphocytes 30.4 %    % Monocytes 8.1 %    % Eosinophils 0.9 %    % Basophils 0.4 %    % Immature Granulocytes 0.4 %    Nucleated RBCs 0 0 /100    Absolute Neutrophil 3.3 1.6 - 8.3 10e9/L    Absolute Lymphocytes 1.7 0.8 - 5.3 10e9/L    Absolute Monocytes 0.5 0.0 - 1.3 10e9/L    Absolute Eosinophils 0.1 0.0 - 0.7 10e9/L    Absolute Basophils 0.0 0.0 - 0.2 10e9/L    Abs Immature Granulocytes 0.0 0 - 0.4 10e9/L    Absolute Nucleated RBC 0.0    Basic metabolic panel   Result Value Ref Range    Sodium 140 133 - 144 mmol/L    Potassium 3.3 (L) 3.4 - 5.3 mmol/L    Chloride 107 94 - 109 mmol/L    Carbon Dioxide 29 20 - 32 mmol/L    Anion Gap 4 3 - 14 mmol/L    Glucose 150 (H) 70 - 99 mg/dL    Urea Nitrogen 9 7 - 30 mg/dL    Creatinine 0.89 0.66 - 1.25 mg/dL    GFR Estimate >90 >60 mL/min/[1.73_m2]    GFR Estimate If Black >90 >60 mL/min/[1.73_m2]    Calcium 8.4 (L) 8.5 - 10.1 mg/dL       Medications - No data to display     5:34 AM: Discussed with poison control by phone. Recommends 4 hour observation (9am will be roughly 4 hours after last narcan dose). If no further resedation, okay to discharge.  If requires further Narcan, would require further observation of at least 4 more hours.  Could consider admission at that point as well.      6:28 AM: Patient was  signed out at shift change to Dr Dow.      Assessments & Plan (with Medical Decision Making)  30-year-old male with history of drug abuse in the past presenting for evaluation of accidental opiate overdose.  Reportedly injected about 20 mg of fentanyl into his right arm.  Patient reports he routinely uses 10 to 15 mg at a time but increased his dose to 20 mg tonight.  Patient states he is used the same source of drug in the past without difficulty.  Friend who is with him noticed the overdose and gave him intranasal Narcan and contacted 911.  Was given additional dose of Narcan by police and a third dose of IV 0.5 mg naloxone by EMS.  Patient became somewhat agitated initially after IV Narcan but was called by EMS and calm and cooperative upon arrival in the ED.  Patient admits to recreational opiate injection use.  He complained of mild headache but otherwise feels okay in the ED with no other specific complaints.  Placed on cardiac monitor with end-tidal CO2 monitoring.  Respiratory rate around 12 at rest. Signed out at shift change for continued monitoring until around 9am.     I have reviewed the nursing notes.    I have reviewed the findings, diagnosis, plan and need for follow up with the patient.       New Prescriptions    NALOXONE (NARCAN) 4 MG/0.1ML NASAL SPRAY    Spray 1 spray (4 mg) into one nostril alternating nostrils as needed for opioid reversal every 2-3 minutes until assistance arrives       Final diagnoses:   Opiate overdose, accidental or unintentional, initial encounter (H)       2/21/2021   [facility] EMERGENCY DEPT     George, Ihsan Walker MD  02/21/21 [id number]

## 2021-02-21 NOTE — ED NOTES
Assumed care of pt who was brought in via EMS for an opiate overdose. In report I was told he was breathing about 10 breaths a min. Upon assessment he was difficult to arouse, shallow breathing with AWRR of 8 to 9 BPM. He was laying quietly in no distress just pale with a 100% sat on RA.

## 2021-02-21 NOTE — ED NOTES
Emergency Department Patient Sign-out       Brief HPI:  This is a 30 year old male signed out to me by Dr. George .  See initial ED Provider note for details of the presentation. The patient had an unintentional overdose on opiates, received several doses of Narcan. Now stable, pending observation period.     Significant Events prior to my assuming care: Patient's respiratory rate went to 10 at 704. I went to bedside and he responded to voice, was moving air. I ordered a blood gas and reviewed it. At 1300, the patient was awake and walking around, asking to leave. He has a ride. I counseled him about his drug use and encouraged him to view this episode as a wake up call. Will get Narcan rx filled. Breathing easily, no e/o opioid toxicity on exam. Safe for discharge.           Exam:   Patient Vitals for the past 24 hrs:   BP Temp Temp src Pulse Resp SpO2 Height Weight   02/21/21 0630 116/74 -- -- 87 10 100 % -- --   02/21/21 0615 113/75 -- -- 86 11 100 % -- --   02/21/21 0600 114/79 -- -- 87 10 100 % -- --   02/21/21 0545 116/79 -- -- 89 8 99 % -- --   02/21/21 0530 122/82 -- -- 95 -- 100 % -- --   02/21/21 0516 -- -- -- 100 11 99 % -- --   02/21/21 0515 124/85 -- -- 100 -- -- -- --   02/21/21 0513 125/89 -- -- 102 17 99 % -- --   02/21/21 0510 132/86 98.3  F (36.8  C) Oral 99 18 99 % 1.829 m (6') 92 kg (202 lb 13.2 oz)           ED RESULTS:   Results for orders placed or performed during the hospital encounter of 02/21/21 (from the past 24 hour(s))   CBC with platelets differential     Status: Abnormal    Collection Time: 02/21/21  5:07 AM   Result Value Ref Range    WBC 5.6 4.0 - 11.0 10e9/L    RBC Count 4.62 4.4 - 5.9 10e12/L    Hemoglobin 13.0 (L) 13.3 - 17.7 g/dL    Hematocrit 38.3 (L) 40.0 - 53.0 %    MCV 83 78 - 100 fl    MCH 28.1 26.5 - 33.0 pg    MCHC 33.9 31.5 - 36.5 g/dL    RDW 11.9 10.0 - 15.0 %    Platelet Count 270 150 - 450 10e9/L    Diff Method Automated Method     % Neutrophils 59.8 %    %  Lymphocytes 30.4 %    % Monocytes 8.1 %    % Eosinophils 0.9 %    % Basophils 0.4 %    % Immature Granulocytes 0.4 %    Nucleated RBCs 0 0 /100    Absolute Neutrophil 3.3 1.6 - 8.3 10e9/L    Absolute Lymphocytes 1.7 0.8 - 5.3 10e9/L    Absolute Monocytes 0.5 0.0 - 1.3 10e9/L    Absolute Eosinophils 0.1 0.0 - 0.7 10e9/L    Absolute Basophils 0.0 0.0 - 0.2 10e9/L    Abs Immature Granulocytes 0.0 0 - 0.4 10e9/L    Absolute Nucleated RBC 0.0    Basic metabolic panel     Status: Abnormal    Collection Time: 02/21/21  5:07 AM   Result Value Ref Range    Sodium 140 133 - 144 mmol/L    Potassium 3.3 (L) 3.4 - 5.3 mmol/L    Chloride 107 94 - 109 mmol/L    Carbon Dioxide 29 20 - 32 mmol/L    Anion Gap 4 3 - 14 mmol/L    Glucose 150 (H) 70 - 99 mg/dL    Urea Nitrogen 9 7 - 30 mg/dL    Creatinine 0.89 0.66 - 1.25 mg/dL    GFR Estimate >90 >60 mL/min/[1.73_m2]    GFR Estimate If Black >90 >60 mL/min/[1.73_m2]    Calcium 8.4 (L) 8.5 - 10.1 mg/dL       ED MEDICATIONS:   Medications - No data to display      Impression:    ICD-10-CM    1. Opiate overdose, accidental or unintentional, initial encounter (H)  T40.601A Basic metabolic panel       Plan:    Observation and reassess.        MD Paloma Ayala Jacob Francis, MD  02/21/21 [id number]

## 2021-02-21 NOTE — ED NOTES
Pt woke up started talking and fell back asleep. He is still shallow breathing with no response from MD yuniel was notified and he will be given an additional dose

## 2021-02-21 NOTE — ED NOTES
Bed: ED03  Expected date: 2/21/21  Expected time: 5:03 AM  Means of arrival:   Comments:  [id number]

## 2021-02-21 NOTE — ED NOTES
Pt was able to ambulate under his own power to the restroom with no gait disturbances. He is still very sleepy and fell asleep on the side of the bed when I was changing his gown. He was provided with a lunch tray and is now sitting up eating

## 2021-02-21 NOTE — ED NOTES
Pt up to nurses station asking about paperwork to leave.  Walking around room.  Provider notified.

## 2021-02-21 NOTE — ED NOTES
Pt is now much more awake talking asking for food. He was allowed to return back to sleep, gutierrez lepe placed for warmth, and reports that he is comfortable and is in no distress. AWRR is 20 and 100% on RA he is in no distress and resting quietly.

## 2021-02-21 NOTE — ED NOTES
Pt still shallow breathing with no response from MD yuniel was notified and he will be given an additional dose

## 2021-02-21 NOTE — ED NOTES
Ems states that they were called for a Fentanyl overdose. This patient is awake after Narcan Nasally by police and a friend as well as IV 0.5 mg by EMS. The pt states that he mixed up 60 mg of Fentanyl and injected 20 my in his vein at 0415. He states that he uses every 3 days. He usually injects 10-15 mg. He is alert, oriented and cooperative in the ED. He has been in Adderall in the past and life was better then. He uses Methamphetamines as well as Marijuana.

## 2021-10-25 ENCOUNTER — HOSPITAL ENCOUNTER (EMERGENCY)
Facility: CLINIC | Age: 31
Discharge: HOME | End: 2021-10-25
Attending: FAMILY MEDICINE | Admitting: FAMILY MEDICINE
Payer: COMMERCIAL

## 2021-10-25 VITALS
RESPIRATION RATE: 20 BRPM | OXYGEN SATURATION: 99 % | BODY MASS INDEX: 26.41 KG/M2 | TEMPERATURE: 97.3 F | HEART RATE: 100 BPM | HEIGHT: 72 IN | SYSTOLIC BLOOD PRESSURE: 162 MMHG | WEIGHT: 195 LBS | DIASTOLIC BLOOD PRESSURE: 100 MMHG

## 2021-10-25 DIAGNOSIS — T15.02XA FOREIGN BODY OF LEFT CORNEA, INITIAL ENCOUNTER: ICD-10-CM

## 2021-10-25 PROCEDURE — 99283 EMERGENCY DEPT VISIT LOW MDM: CPT | Mod: 25 | Performed by: FAMILY MEDICINE

## 2021-10-25 PROCEDURE — 65222 REMOVE FOREIGN BODY FROM EYE: CPT | Mod: LT | Performed by: FAMILY MEDICINE

## 2021-10-25 RX ORDER — GENTAMICIN SULFATE 3 MG/ML
2 SOLUTION/ DROPS OPHTHALMIC 4 TIMES DAILY
Qty: 5 ML | Refills: 0 | Status: SHIPPED | OUTPATIENT
Start: 2021-10-25 | End: 2021-10-30

## 2021-10-25 RX ORDER — TETRACAINE HYDROCHLORIDE 5 MG/ML
1-2 SOLUTION OPHTHALMIC ONCE
Status: DISCONTINUED | OUTPATIENT
Start: 2021-10-25 | End: 2021-10-25 | Stop reason: HOSPADM

## 2021-10-25 ASSESSMENT — MIFFLIN-ST. JEOR: SCORE: 1887.51

## 2021-10-25 NOTE — DISCHARGE INSTRUCTIONS
Gentamicin ophthalmic drops as directed x5 days.  You may use Tylenol or ibuprofen for discomfort.  If you have any residual symptoms over the next 24-48 hours please return to ophthalmology clinic for repeat exam.

## 2021-10-25 NOTE — ED PROVIDER NOTES
History     Chief Complaint   Patient presents with     Foreign Body in Eye     left eye, occured 6 days ago.      HPI  Lino Colby is a 31 year old male, has medical history is significant for amphetamine abuse, cannabis abuse, opioid abuse, hallucinogenic mushroom disorder, tobacco use disorder, presents to the emergency department with concerns of possible foreign body in his left eye occurring 6 days ago.  History is obtained from the patient who states that he was working in the [employer] barn few days ago probably about 5 or 6 and thinks he got some rust or something in his left eye.  He has been able to see something on the eye and has rubbed it repeatedly trying to get it out but symptoms have persisted.  He does have some blurring of vision and states that the foreign body seems to be located over the iris.  There is no associated headache nausea or vomiting.    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    Past Surgical History:    No past surgical history on file.    Family History:    Family History   Problem Relation Age of Onset     Substance Abuse Paternal Grandmother      Substance Abuse Paternal Grandfather      Substance Abuse Other        Social History:  Marital Status:  Single [1]  Social History     Tobacco Use     Smoking status: Current Every Day Smoker     Packs/day: 0.25     Smokeless tobacco: Former User   Substance Use Topics     Alcohol use: No      Drug use: No        Medications:    gentamicin (GARAMYCIN) 0.3 % ophthalmic solution  naloxone (NARCAN) 4 MG/0.1ML nasal spray          Review of Systems   All other systems reviewed and are negative.      Physical Exam   BP: (!) 162/100  Pulse: 100  Temp: 97.3  F (36.3  C)  Resp: 20  Height: 182.9 cm (6')  Weight: 88.5 kg (195 lb)  SpO2: 99 %      Physical Exam  Vitals and nursing note reviewed.   Constitutional:       Appearance: Normal appearance. He is normal weight.   HENT:      Head: Normocephalic and atraumatic.      Right Ear: Tympanic membrane, ear canal and external ear normal.      Left Ear: Tympanic membrane, ear canal and external ear normal.      Nose: Nose normal.      Mouth/Throat:      Mouth: Mucous membranes are moist.      Pharynx: Oropharynx is clear.   Eyes:      Comments: Corneal metallic foreign body in the pupillary axis of the left eye centrally, there is an attendant rust ring.  Anterior chamber appears clear, PERRL extraocular range of motion normal.  Minimal conjunctival injection on the left side.   Musculoskeletal:      Cervical back: Normal range of motion and neck supple.   Neurological:      Mental Status: He is alert.         ED Course        Procedures  PROCEDURE NOTE:  With verbal consent from the patient I instilled 2 drops of tetracaine to the left eye and then used an ophthalmic bur tool and a sterile cotton-tipped applicator to remove the metallic foreign body as well as the rust ring without difficulty.  Well-tolerated.              Critical Care time:  none               No results found for this or any previous visit (from the past 24 hour(s)).    Medications - No data to display    Assessments & Plan (with Medical Decision Making)   31-year-old male past medical history reviewed and significant presents to the emergency room with concerns for foreign body to the left eye present for approximately 5 to 6 days as discussed in the HPI.  On exam he is a metallic corneal  foreign body in the pupillary axis of the left eye.  There is a rust ring present.  This was all removed with an ophthalmic bur tool and a sterile cotton-tipped applicator as detailed in the procedure note.  Well-tolerated.  The patient is placed on Garamycin ophthalmic drops as directed next 3 days.  If he is not noting significant improvement to baseline within the next 48 hours is advised to return to the emergency department or follow-up in clinic with ophthalmology.      Disclaimer: This note consists of symbols derived from keyboarding, dictation and/or voice recognition software. As a result, there may be errors in the script that have gone undetected. Please consider this when interpreting information found in this chart.      I have reviewed the nursing notes.    I have reviewed the findings, diagnosis, plan and need for follow up with the patient.       Discharge Medication List as of 10/25/2021  8:59 AM      START taking these medications    Details   gentamicin (GARAMYCIN) 0.3 % ophthalmic solution Place 2 drops Into the left eye 4 times daily for 5 days, Disp-5 mL, R-0, E-Prescribe             Final diagnoses:   Foreign body of left cornea, initial encounter - Metal foreign body removed as well as attendant rust ring       10/25/2021   [facility] EMERGENCY DEPT     Panfilo Moncada MD  10/29/21 1794

## 2025-03-09 ENCOUNTER — HOSPITAL ENCOUNTER (EMERGENCY)
Facility: CLINIC | Age: 35
Discharge: HOME | End: 2025-03-09
Attending: EMERGENCY MEDICINE | Admitting: EMERGENCY MEDICINE
Payer: COMMERCIAL

## 2025-03-09 VITALS
SYSTOLIC BLOOD PRESSURE: 113 MMHG | RESPIRATION RATE: 16 BRPM | HEART RATE: 70 BPM | OXYGEN SATURATION: 98 % | WEIGHT: 185 LBS | TEMPERATURE: 98.1 F | BODY MASS INDEX: 25.06 KG/M2 | DIASTOLIC BLOOD PRESSURE: 74 MMHG | HEIGHT: 72 IN

## 2025-03-09 DIAGNOSIS — B86 SCABIES: ICD-10-CM

## 2025-03-09 PROCEDURE — 99283 EMERGENCY DEPT VISIT LOW MDM: CPT | Performed by: EMERGENCY MEDICINE

## 2025-03-09 RX ORDER — PERMETHRIN 50 MG/G
CREAM TOPICAL
Qty: 60 G | Refills: 1 | Status: SHIPPED | OUTPATIENT
Start: 2025-03-09

## 2025-03-09 RX ORDER — PERMETHRIN 50 MG/G
CREAM TOPICAL
Qty: 60 G | Refills: 1 | Status: SHIPPED | OUTPATIENT
Start: 2025-03-09 | End: 2025-03-09

## 2025-03-09 ASSESSMENT — ACTIVITIES OF DAILY LIVING (ADL)
ADLS_ACUITY_SCORE: 41
ADLS_ACUITY_SCORE: 41

## 2025-03-09 ASSESSMENT — COLUMBIA-SUICIDE SEVERITY RATING SCALE - C-SSRS
6. HAVE YOU EVER DONE ANYTHING, STARTED TO DO ANYTHING, OR PREPARED TO DO ANYTHING TO END YOUR LIFE?: NO
2. HAVE YOU ACTUALLY HAD ANY THOUGHTS OF KILLING YOURSELF IN THE PAST MONTH?: NO
1. IN THE PAST MONTH, HAVE YOU WISHED YOU WERE DEAD OR WISHED YOU COULD GO TO SLEEP AND NOT WAKE UP?: NO

## 2025-03-10 NOTE — DISCHARGE INSTRUCTIONS
Return if symptoms worsen or new symptoms develop.  Follow-up with primary care after medications have been given drink plenty of fluids.  If increased redness rash fevers chills nausea vomiting or other symptoms present please return for recheck.

## 2025-03-10 NOTE — ED TRIAGE NOTES
Patient's son came home from  with scabies and he is here to be examined and prescribed medicaiton. Patient does not have any sign of scabies at this time.      Triage Assessment (Adult)       Row Name 03/09/25 1930          Triage Assessment    Airway WDL WDL        Respiratory WDL    Respiratory WDL WDL        Skin Circulation/Temperature WDL    Skin Circulation/Temperature WDL WDL        Cardiac WDL    Cardiac WDL WDL        Peripheral/Neurovascular WDL    Peripheral Neurovascular WDL WDL        Cognitive/Neuro/Behavioral WDL    Cognitive/Neuro/Behavioral WDL WDL

## 2025-03-12 NOTE — ED PROVIDER NOTES
History     Chief Complaint   Patient presents with    Rash     HPI  Lino Colby is a 34 year old male with past medical history significant for history of previous drug abuse who presents the emergency department complaining of abdominal itching back itching and exposure to scabies.  Patient's son was diagnosed with scabies recently and was given treatment but now patient's mom wife has also developed a rash and he is now having a mild itching pruritus of his abdomen with dry skin question rash and he is requesting treatment for scabies.  He denies any other symptoms has not had a fever or chills denies any chest pain or shortness of breath has not had any abdominal pain denies any back pain has not had any focal numbness weakness in extremity.     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    Past Surgical History:    No past surgical history on file.    Family History:    Family History   Problem Relation Age of Onset    Substance Abuse Paternal Grandmother     Substance Abuse Paternal Grandfather     Substance Abuse Other        Social History:  Marital Status:  Single [1]  Social History     Tobacco Use    Smoking status: Every Day     Current packs/day: 0.25     Types: Cigarettes    Smokeless tobacco: Former   Substance Use Topics    Alcohol use: No    Drug use: No        Medications:    permethrin (ELIMITE) 5 % external cream  naloxone (NARCAN) 4 MG/0.1ML nasal  spray          Review of Systems  As per HPI  Physical Exam   BP: 113/74  Pulse: 70  Temp: 98.1  F (36.7  C)  Resp: 16  Height: 182.9 cm (6')  Weight: 83.9 kg (185 lb)  SpO2: 98 %      Physical Exam  Vitals and nursing note reviewed.   Constitutional:       General: He is not in acute distress.     Appearance: Normal appearance. He is not ill-appearing, toxic-appearing or diaphoretic.   HENT:      Nose: Nose normal.      Mouth/Throat:      Mouth: Mucous membranes are moist.      Pharynx: Oropharynx is clear.   Eyes:      Conjunctiva/sclera: Conjunctivae normal.   Pulmonary:      Effort: Pulmonary effort is normal.   Abdominal:      Comments: Faint papular rash/dry skin lower abdomen/back no erythema present this area is mildly itchy at this time.   Musculoskeletal:      Cervical back: Normal range of motion.   Skin:     General: Skin is warm and dry.      Findings: Rash present.   Neurological:      General: No focal deficit present.      Mental Status: He is alert.      Motor: No weakness.      Coordination: Coordination normal.   Psychiatric:         Mood and Affect: Mood normal.         ED Course        Procedures              Critical Care time:  none     None         No results found for this or any previous visit (from the past 24 hours).    Medications - No data to display    Assessments & Plan (with Medical Decision Making) records reviewed including past medical history medications and allergies.  Patient has not had no recent ED or office visits.  Patient's son is present and definitely has scabies on my examination and patient is developing some signs of early scabies.  I feel he needs to be treated also.  Patient will be given Elimite cream he should use this as directed.  If symptoms persist or return patient should repeat doses as directed.  Patient is comfortable with this plan at this time.     I have reviewed the nursing notes.    I have reviewed the findings, diagnosis, plan and need for follow up  with the patient.             Discharge Medication List as of 3/9/2025  9:09 PM          Final diagnoses:   Scabies       3/9/2025   [facility] EMERGENCY DEPT       Keenan Marquez MD  03/12/25 0025